# Patient Record
Sex: MALE | URBAN - METROPOLITAN AREA
[De-identification: names, ages, dates, MRNs, and addresses within clinical notes are randomized per-mention and may not be internally consistent; named-entity substitution may affect disease eponyms.]

---
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
## 2023-05-03 NOTE — PROCEDURE: ISOTRETINOIN MONITORING
Patient calling states he would like to know if he can fly or is he compromised due to his stents. Patient stents were placed in 2017, advised if he has any concerns about flying on the plane he should feel free to wear a mask. Patient verbalizes understanding and will call with any further questions or concerns.    Headache Monitoring: I recommended monitoring the headaches for now. There is no evidence of increased intracranial pressure. They were instructed to call if the headaches are worsening.

## 2024-07-18 NOTE — PROCEDURE: MIPS QUALITY
Quality 431: Preventive Care And Screening: Unhealthy Alcohol Use - Screening: Patient screened for unhealthy alcohol use using a single question and scores less than 2 times per year
Detail Level: Detailed
Quality 130: Documentation Of Current Medications In The Medical Record: Current Medications Documented
Quality 128: Preventive Care And Screening: Body Mass Index (Bmi) Screening And Follow-Up Plan: BMI is documented within normal parameters and no follow-up plan is required.
Quality 226: Preventive Care And Screening: Tobacco Use: Screening And Cessation Intervention: Patient screened for tobacco and never smoked
maximum assist (25% patients effort)

## 2024-12-04 NOTE — PROCEDURE: ISOTRETINOIN MONITORING
Medication Changes:  none     Recommendations:  Notify us if palpitations are worsening  Check blood pressure at least 1 hour after medications. Call the clinic if your blood pressure is consistently greater than 140/90.     Follow-up:  Cardiology follow up at [facility]: Dr. Mayo in 6 months.     Cardiology Scheduling~[phone]  Cardiology Clinic RN~[phone] (Ellen RN, Lary RN; Jacquelyn RN)       Months Of Therapy Completed: 4